# Patient Record
Sex: MALE | Race: BLACK OR AFRICAN AMERICAN | NOT HISPANIC OR LATINO | Employment: FULL TIME | URBAN - METROPOLITAN AREA
[De-identification: names, ages, dates, MRNs, and addresses within clinical notes are randomized per-mention and may not be internally consistent; named-entity substitution may affect disease eponyms.]

---

## 2023-07-12 ENCOUNTER — OFFICE VISIT (OUTPATIENT)
Dept: FAMILY MEDICINE | Facility: CLINIC | Age: 38
End: 2023-07-12
Payer: COMMERCIAL

## 2023-07-12 VITALS
HEART RATE: 61 BPM | WEIGHT: 184.8 LBS | HEIGHT: 68 IN | DIASTOLIC BLOOD PRESSURE: 87 MMHG | SYSTOLIC BLOOD PRESSURE: 137 MMHG | BODY MASS INDEX: 28.01 KG/M2 | OXYGEN SATURATION: 98 % | RESPIRATION RATE: 12 BRPM | TEMPERATURE: 98.4 F

## 2023-07-12 DIAGNOSIS — B50.9: Primary | ICD-10-CM

## 2023-07-12 DIAGNOSIS — A41.9: ICD-10-CM

## 2023-07-12 DIAGNOSIS — D69.6 THROMBOCYTOPENIA (H): ICD-10-CM

## 2023-07-12 LAB
BASOPHILS # BLD AUTO: 0 10E3/UL (ref 0–0.2)
BASOPHILS NFR BLD AUTO: 1 %
EOSINOPHIL # BLD AUTO: 0.2 10E3/UL (ref 0–0.7)
EOSINOPHIL NFR BLD AUTO: 5 %
ERYTHROCYTE [DISTWIDTH] IN BLOOD BY AUTOMATED COUNT: 13.5 % (ref 10–15)
HCT VFR BLD AUTO: 36.7 % (ref 40–53)
HGB BLD-MCNC: 12 G/DL (ref 13.3–17.7)
IMM GRANULOCYTES # BLD: 0 10E3/UL
IMM GRANULOCYTES NFR BLD: 0 %
LYMPHOCYTES # BLD AUTO: 2.1 10E3/UL (ref 0.8–5.3)
LYMPHOCYTES NFR BLD AUTO: 43 %
MCH RBC QN AUTO: 29.1 PG (ref 26.5–33)
MCHC RBC AUTO-ENTMCNC: 32.7 G/DL (ref 31.5–36.5)
MCV RBC AUTO: 89 FL (ref 78–100)
MONOCYTES # BLD AUTO: 0.4 10E3/UL (ref 0–1.3)
MONOCYTES NFR BLD AUTO: 8 %
NEUTROPHILS # BLD AUTO: 2.1 10E3/UL (ref 1.6–8.3)
NEUTROPHILS NFR BLD AUTO: 43 %
PLATELET # BLD AUTO: 232 10E3/UL (ref 150–450)
RBC # BLD AUTO: 4.13 10E6/UL (ref 4.4–5.9)
WBC # BLD AUTO: 5 10E3/UL (ref 4–11)

## 2023-07-12 PROCEDURE — 99203 OFFICE O/P NEW LOW 30 MIN: CPT | Performed by: PREVENTIVE MEDICINE

## 2023-07-12 PROCEDURE — 36415 COLL VENOUS BLD VENIPUNCTURE: CPT | Performed by: PREVENTIVE MEDICINE

## 2023-07-12 PROCEDURE — 85025 COMPLETE CBC W/AUTO DIFF WBC: CPT | Performed by: PREVENTIVE MEDICINE

## 2023-07-12 PROCEDURE — 80053 COMPREHEN METABOLIC PANEL: CPT | Performed by: PREVENTIVE MEDICINE

## 2023-07-12 NOTE — PROGRESS NOTES
"  Assessment & Plan     Plasmodium falciparum malaria  - Comprehensive metabolic panel  -History of travel to [facility], developed fevers, shaking chills and body aches  -Malaria testing was positive  -Completed Coartem   -Patient with no symptoms at this time    Sepsis without septic shock (H)  - Comprehensive metabolic panel  -Sepsis resolved in the hospital    Thrombocytopenia (H)  - Comprehensive metabolic panel  - CBC with Platelets & Differential  -Likely due to malaria  -Recheck platelets today  -Platelets had decreased down to the 40s in the hospital  -Patient does not have any symptoms of bleeding or bruising      Review of external notes as documented elsewhere in note  Ordering of each unique test  15 minutes spent by me on the date of the encounter doing chart review, history and exam, documentation and further activities per the note     MED REC REQUIRED  Post Medication Reconciliation Status: discharge medications reconciled, continue medications without change  BMI:   Estimated body mass index is 28.1 kg/m  as calculated from the following:    Height as of this encounter: 1.727 m (5' 8\").    Weight as of this encounter: 83.8 kg (184 lb 12.8 oz).     Ambika Briones MD MPH    [facility] NICOLAS Mendiola is a 38 year old, presenting for the following health issues:  Hospital F/U        7/12/2023    10:52 AM   Additional Questions   Roomed by chet   Accompanied by self         7/12/2023    10:52 AM   Patient Reported Additional Medications   Patient reports taking the following new medications none     HPI     Overall feeling good  Symptoms have resolved   No headaches  No fever, no muscle aches, no abdominal pain, no emesis, appetite is normal  No bleeding or easy bruising  No rash  No difficulty breathing      Hospital Follow-up Visit:    Hospital/Nursing Home/IP Rehab Facility: [facility]   Date of Admission: 06/23/2023  Date of Discharge: 06/25/2023  Reason(s) " "for Admission: Malaria    Was your hospitalization related to COVID-19? No   Problems taking medications regularly:  None  Medication changes since discharge: None  Problems adhering to non-medication therapy:  None    Summary of hospitalization:  CareEverywhere information obtained and reviewed  Diagnostic Tests/Treatments reviewed.  Follow up needed: Labs needed   Other Healthcare Providers Involved in Patient s Care:         None  Update since discharge: improved.         Plan of care communicated with patient      The following is the hospital summary:    \"Hospital Course, by problem:   Sepsis--due to malaria  P. Falciparum malaria  Thrombocytopenia  Abnormal LFTs  Recent trip to [place] 4/24-6/22; developed fevers, shaking chills, body aches and generalized weakness on 6/22; patient was febrile and tachycardic on presentation. Malaria testing is positive.Pertinent labs with mild anemia, thrombocytopenia--now with platelets in 40s, elevated bilirubin. Does not have evidence of severe malaria, will monitor for this. No confusion currently. Sepsis resolved  -ID consulted--continue Coartem x 6 doses (last dose this evening)  -Plt improving. Smear from yesterday with improvement.   -Will give script for CBC for this Friday     Thrombocytopenia  Likely due to malaria. Plt trending up compared to yesterday.     Hepatitis B Core Antibody Positive  - Surface negative. Surface ab negative.   - Discussed with ID. They will order a HBV DNA and follow-up on results.     Primary care/TCU recommendations for follow up, including significant medication changes, medications being held, or recommended imaging or labs: CBC for Plt count \"                 Review of Systems   Constitutional, HEENT, cardiovascular, pulmonary, gi and gu systems are negative, except as otherwise noted.      Objective    /87 (BP Location: Left arm, Patient Position: Sitting, Cuff Size: Adult Regular)   Pulse 61   Temp 98.4  F (36.9  C) (Oral)   " "Resp 12   Ht 1.727 m (5' 8\")   Wt 83.8 kg (184 lb 12.8 oz)   SpO2 98%   BMI 28.10 kg/m    Body mass index is 28.1 kg/m .  Physical Exam   GENERAL APPEARANCE: healthy, alert and no distress  EYES: Eyes grossly normal to inspection and conjunctivae and sclerae normal  RESP: lungs clear to auscultation - no rales, rhonchi or wheezes  CV: regular rates and rhythm, normal S1 S2, no S3 or S4 and no murmur, click or rub  ABDOMEN: soft, non-tender and no rebound or guarding   MS: extremities normal- no gross deformities noted and peripheral pulses normal  SKIN: no suspicious lesions or rashes  NEURO: Normal strength and tone, mentation intact and speech normal  PSYCH: mentation appears normal      Results for orders placed or performed in visit on 07/12/23 (from the past 24 hour(s))   CBC with Platelets & Differential    Narrative    The following orders were created for panel order CBC with Platelets & Differential.  Procedure                               Abnormality         Status                     ---------                               -----------         ------                     CBC with platelets and d...[[id number]]                      In process                   Please view results for these tests on the individual orders.             "

## 2023-07-12 NOTE — RESULT ENCOUNTER NOTE
Maury, your test results were within normal limits.  Platelet levels are normal.  Hemoglobin is also improved from last check in the hospital.     Please do not hesitate to call us at [phone] if you have any questions or concerns.    Thank you,    Ambika Briones MD MPH

## 2023-07-13 LAB
ALBUMIN SERPL BCG-MCNC: 4 G/DL (ref 3.5–5.2)
ALP SERPL-CCNC: 61 U/L (ref 40–129)
ALT SERPL W P-5'-P-CCNC: 64 U/L (ref 0–70)
ANION GAP SERPL CALCULATED.3IONS-SCNC: 9 MMOL/L (ref 7–15)
AST SERPL W P-5'-P-CCNC: 78 U/L (ref 0–45)
BILIRUB SERPL-MCNC: 1 MG/DL
BUN SERPL-MCNC: 4.8 MG/DL (ref 6–20)
CALCIUM SERPL-MCNC: 8.9 MG/DL (ref 8.6–10)
CHLORIDE SERPL-SCNC: 104 MMOL/L (ref 98–107)
CREAT SERPL-MCNC: 1.02 MG/DL (ref 0.67–1.17)
DEPRECATED HCO3 PLAS-SCNC: 26 MMOL/L (ref 22–29)
GFR SERPL CREATININE-BSD FRML MDRD: >90 ML/MIN/1.73M2
GLUCOSE SERPL-MCNC: 81 MG/DL (ref 70–99)
POTASSIUM SERPL-SCNC: 4.2 MMOL/L (ref 3.4–5.3)
PROT SERPL-MCNC: 7.1 G/DL (ref 6.4–8.3)
SODIUM SERPL-SCNC: 139 MMOL/L (ref 136–145)

## 2023-07-17 NOTE — RESULT ENCOUNTER NOTE
Maury,    Electrolytes, glucose and kidney function are normal.  One liver test is slightly elevated, most likely from recent malaria infection.  Plan of care and follow up as discussed in clinic.n     Please do not hesitate to call us at [phone] if you have any questions or concerns.    Thank you,    Ambika Briones MD MPH

## 2023-08-13 ENCOUNTER — HEALTH MAINTENANCE LETTER (OUTPATIENT)
Age: 38
End: 2023-08-13

## 2024-10-06 ENCOUNTER — HEALTH MAINTENANCE LETTER (OUTPATIENT)
Age: 39
End: 2024-10-06